# Patient Record
Sex: FEMALE | Race: AMERICAN INDIAN OR ALASKA NATIVE
[De-identification: names, ages, dates, MRNs, and addresses within clinical notes are randomized per-mention and may not be internally consistent; named-entity substitution may affect disease eponyms.]

---

## 2019-09-05 ENCOUNTER — HOSPITAL ENCOUNTER (INPATIENT)
Dept: HOSPITAL 5 - TRG | Age: 29
LOS: 2 days | Discharge: HOME | DRG: 781 | End: 2019-09-07
Attending: OBSTETRICS & GYNECOLOGY | Admitting: OBSTETRICS & GYNECOLOGY
Payer: MEDICAID

## 2019-09-05 DIAGNOSIS — O99.333: ICD-10-CM

## 2019-09-05 DIAGNOSIS — F17.200: ICD-10-CM

## 2019-09-05 DIAGNOSIS — J45.909: ICD-10-CM

## 2019-09-05 DIAGNOSIS — O99.89: Primary | ICD-10-CM

## 2019-09-05 DIAGNOSIS — O99.513: ICD-10-CM

## 2019-09-05 DIAGNOSIS — N13.2: ICD-10-CM

## 2019-09-05 DIAGNOSIS — Z3A.36: ICD-10-CM

## 2019-09-05 LAB
ALBUMIN SERPL-MCNC: 3.1 G/DL (ref 3.9–5)
ALT SERPL-CCNC: < 5 UNITS/L (ref 7–56)
BASOPHILS # (AUTO): 0.1 K/MM3 (ref 0–0.1)
BASOPHILS NFR BLD AUTO: 0.7 % (ref 0–1.8)
BILIRUB UR QL STRIP: (no result)
BLOOD UR QL VISUAL: (no result)
BUN SERPL-MCNC: 7 MG/DL (ref 7–17)
BUN/CREAT SERPL: 14 %
CALCIUM SERPL-MCNC: 8.7 MG/DL (ref 8.4–10.2)
EOSINOPHIL # BLD AUTO: 0.2 K/MM3 (ref 0–0.4)
EOSINOPHIL NFR BLD AUTO: 2 % (ref 0–4.3)
HCT VFR BLD CALC: 28.7 % (ref 30.3–42.9)
HEMOLYSIS INDEX: 7
HGB BLD-MCNC: 9.7 GM/DL (ref 10.1–14.3)
LYMPHOCYTES # BLD AUTO: 2.1 K/MM3 (ref 1.2–5.4)
LYMPHOCYTES NFR BLD AUTO: 26.7 % (ref 13.4–35)
MCHC RBC AUTO-ENTMCNC: 34 % (ref 30–34)
MCV RBC AUTO: 99 FL (ref 79–97)
MONOCYTES # (AUTO): 0.4 K/MM3 (ref 0–0.8)
MONOCYTES % (AUTO): 5.6 % (ref 0–7.3)
MUCOUS THREADS #/AREA URNS HPF: (no result) /HPF
PH UR STRIP: 6 [PH] (ref 5–7)
PLATELET # BLD: 158 K/MM3 (ref 140–440)
RBC # BLD AUTO: 2.92 M/MM3 (ref 3.65–5.03)
RBC #/AREA URNS HPF: > 182 /HPF (ref 0–6)
UROBILINOGEN UR-MCNC: 4 MG/DL (ref ?–2)
WBC #/AREA URNS HPF: 61 /HPF (ref 0–6)

## 2019-09-05 PROCEDURE — 82962 GLUCOSE BLOOD TEST: CPT

## 2019-09-05 PROCEDURE — 36415 COLL VENOUS BLD VENIPUNCTURE: CPT

## 2019-09-05 PROCEDURE — 80053 COMPREHEN METABOLIC PANEL: CPT

## 2019-09-05 PROCEDURE — 81001 URINALYSIS AUTO W/SCOPE: CPT

## 2019-09-05 PROCEDURE — 71045 X-RAY EXAM CHEST 1 VIEW: CPT

## 2019-09-05 PROCEDURE — 80048 BASIC METABOLIC PNL TOTAL CA: CPT

## 2019-09-05 PROCEDURE — 76770 US EXAM ABDO BACK WALL COMP: CPT

## 2019-09-05 PROCEDURE — C1769 GUIDE WIRE: HCPCS

## 2019-09-05 PROCEDURE — C1729 CATH, DRAINAGE: HCPCS

## 2019-09-05 PROCEDURE — 85025 COMPLETE CBC W/AUTO DIFF WBC: CPT

## 2019-09-05 PROCEDURE — 87086 URINE CULTURE/COLONY COUNT: CPT

## 2019-09-05 PROCEDURE — 76937 US GUIDE VASCULAR ACCESS: CPT

## 2019-09-05 PROCEDURE — 76775 US EXAM ABDO BACK WALL LIM: CPT

## 2019-09-05 PROCEDURE — 50432 PLMT NEPHROSTOMY CATHETER: CPT

## 2019-09-05 RX ADMIN — OXYCODONE AND ACETAMINOPHEN PRN TAB: 5; 325 TABLET ORAL at 22:20

## 2019-09-05 NOTE — HISTORY AND PHYSICAL REPORT
History of Present Illness


Date of examination: 19


Chief complaint: 





Right flank pain


History of present illness: 





Pt is a 29yo BF  EDC 19;  EGA 36 6/7 weeks presents to [facility] complaining

of right flank pain.  She had a pelvic u/s @ [facility] showing right kidney with a

large hydronephrosis and a small stone in the pelvis.  She received prenatal 

care at [facility] and had 3 previous C Sections. Prenatal records

are not available and GBS is unknown.





Past History


Past Medical History: no pertinent history


Past Surgical History:  section


Family/Genetic History: none


Social history: no significant social history, single





- Obstetrical History


Expected Date of Delivery: 19


Actual Gestation: 37 Week(s) 0 Day(s) 


: 5





Medications and Allergies


                                    Allergies











Allergy/AdvReac Type Severity Reaction Status Date / Time


 


No Known Allergies Allergy   Verified 19 19:42











                                Home Medications











 Medication  Instructions  Recorded  Confirmed  Last Taken  Type


 


Prenatal Vit Calc,Iron,Folic 1 each PO QDAY 17 19:00 

History





[Prenatal Vitamins]    1 














Review of Systems


All systems: negative





- Vital Signs


Vital signs: 


                                   Vital Signs











Pulse BP


 


 83   112/69 


 


 19 18:24  19 18:24








                                        











Temp Pulse Resp BP Pulse Ox


 


 97.9 F   75   14   97/59   99 


 


 19 21:54  19 21:58  19 21:54  19 21:58  19 21:54














- Physical Exam


Breasts: Positive: deferred


Cardiovascular: Regular rate


Lungs: Positive: Clear to auscultation


Abdomen: Positive: tenderness (Right CVAT)


Genitourinary (Female): Positive: normal external genitalia


Uterus: Positive: enlarged


Extremities: Positive: normal





- Obstetrical


FHR: category 1


Uterine Contraction Monitor Mode: External


Uterine Contraction Pattern: Absent





Results


Result Diagrams: 


                                 19 20:25





                                 19 20:25


                              Abnormal lab results











  19 Range/Units





  20:25 20:25 Unknown 


 


RBC  2.92 L    (3.65-5.03)  M/mm3


 


Hgb  9.7 L    (10.1-14.3)  gm/dl


 


Hct  28.7 L    (30.3-42.9)  %


 


MCV  99 H    (79-97)  fl


 


MCH  33 H    (28-32)  pg


 


RDW  13.1 L    (13.2-15.2)  %


 


Sodium   135 L   (137-145)  mmol/L


 


Creatinine   0.5 L   (0.7-1.2)  mg/dL


 


ALT   < 5 L   (7-56)  units/L


 


Albumin   3.1 L   (3.9-5)  g/dL


 


Urine WBC (Auto)    61.0 H  (0.0-6.0)  /HPF


 


U Epithel Cells (Auto)    20.0 H  (0-13.0)  /HPF








All other labs normal.





Ultrasound: report reviewed





Assessment and Plan





- Patient Problems


(1) 36 weeks gestation of pregnancy


Onset Date: 19   Current Visit: Yes   Status: Acute   


Plan to address problem: 


A:  IUP @ 36 6/7 weeks


      Previous C Section x 3


      Right hydronephrosis





 P:  Admit to L&D for Observation


      Obtain Urology consultation








(2) Previous  section


Onset Date: 19   Current Visit: Yes   Status: Chronic   





(3) Hydronephrosis concurrent with and due to calculi of kidney and ureter


Onset Date: 19   Current Visit: Yes   Status: Acute

## 2019-09-05 NOTE — ULTRASOUND REPORT
US renal RT



INDICATION / CLINICAL INFORMATION:

kidney stone.



COMPARISON:

None available.



FINDINGS:

Left kidney and urinary bladder are unremarkable. Right kidney is hydronephrotic, with a large (1.6 c
m) calculus in the renal pelvis near the ureteropelvic junction. Right renal cortex appears very slig
htly thinned.



IMPRESSION:

1. 1.6 cm calculus in the right renal pelvis near the ureteropelvic junction. Marked hydronephrosis a
nd slight cortical thinning suggest long-standing obstruction. 

 



Signer Name: Man Higgins MD 

Signed: 9/5/2019 8:49 PM

 Workstation Name: [id number]

## 2019-09-06 PROCEDURE — BT111ZZ FLUOROSCOPY OF RIGHT KIDNEY USING LOW OSMOLAR CONTRAST: ICD-10-PCS | Performed by: RADIOLOGY

## 2019-09-06 PROCEDURE — 0T9330Z DRAINAGE OF RIGHT KIDNEY PELVIS WITH DRAINAGE DEVICE, PERCUTANEOUS APPROACH: ICD-10-PCS | Performed by: RADIOLOGY

## 2019-09-06 RX ADMIN — Medication SCH: at 18:00

## 2019-09-06 RX ADMIN — SODIUM CHLORIDE, SODIUM LACTATE, POTASSIUM CHLORIDE, AND CALCIUM CHLORIDE SCH MLS/HR: .6; .31; .03; .02 INJECTION, SOLUTION INTRAVENOUS at 22:47

## 2019-09-06 RX ADMIN — OXYCODONE AND ACETAMINOPHEN PRN TAB: 5; 325 TABLET ORAL at 17:59

## 2019-09-06 RX ADMIN — SODIUM CHLORIDE, SODIUM LACTATE, POTASSIUM CHLORIDE, AND CALCIUM CHLORIDE SCH MLS/HR: .6; .31; .03; .02 INJECTION, SOLUTION INTRAVENOUS at 10:46

## 2019-09-06 NOTE — EVENT NOTE
Date: 09/06/19





Following the cessation of anesthesia, the patient sat upright and complained of

shortness of breath.  Her lung sounds are clear bilaterally.  She satted between

96 and 100% on room air.  A stat postprocedure chest x-ray was ordered an 

upright position and demonstrates that the lungs are clear and well-expanded 

with no pleural fluid and no evidence of pneumothorax. The nephrostomy tube is 

well below the diaphragm. The patient has a reported history of substance abuse 

and this may simply be a reaction to anesthesia.  In PACU, the patient calmed 

down and was breathing comfortably on room air.

## 2019-09-06 NOTE — XRAY REPORT
CHEST 1 VIEW 



INDICATION:  respitory difficulty.



COMPARISON:  None



FINDINGS:

Support devices: None. 



Heart: Within normal limits. 

Lungs/Pleura: No acute air space or interstitial disease. 



Additional findings: None.



IMPRESSION:

 No acute findings.



Signer Name: Arnoldo Mccrary Jr, MD 

Signed: 9/6/2019 3:54 PM

 Workstation Name: [id number]

## 2019-09-06 NOTE — POST ANESTHESIA EVALUATION
- Post Anesthesia Evaluation


Patient Participated: Yes


Airway Patent: Yes


Stable Respiratory Function: Yes


Nausea/Vomiting: Yes (resolved with IV antiemetics)


Temp > 96.8F: Yes


Pain Manageable: Yes


Adequeate Hydration: Yes


Anesthesia Complications: No


Other Comments: Immediately post-extubation, patient complained of difficulty 

breathing. Lungs clear, SpO2 >96% on room air, no evidence of residual muscle 

relaxant. Stat CXR in PACU was normal. Patient was extremely anxious and 

intermittently combative despite reassurance. Patient was given albuterol neb, 

antiemetics, and low dose ketamine IV. VS remained stable. At time fo transport 

to L&D, patient was resting calmly, normal WOB on RA, without signs of nausea. 

Post-procedure FHTs 140s.

## 2019-09-06 NOTE — CONSULTATION
History of Present Illness





- Reason for Consult


Consult date: 19





- History of Present Illness








new to our service





Pt is a 27yo BF  EDC 19;  EGA 36 6/7 weeks presents to [facility] complaining

of right flank pain.  She had a pelvic u/s @ [facility] showing right kidney with a

large hydronephrosis and a small stone in the pelvis.  She received prenatal 

care at [facility] and had 3 previous C Sections.





BISHOP---16MM RT  KIDNEY STONE WITH HYDRONEPHROSIS





S/P---RT PERC ONLY (DR. MCCORMICK - ---TODAY)





RESTING





PERC TUBE--BLOODY URINE





A/P





STABLE


HOME WITH PERC WHEN STABLE





PT TO F/U 2-3 WKS AFTER DELIVERY FOR CTAP & STONE TREATMENT PLANNING





Past History


Social history: no significant social history, single





Medications and Allergies


                                    Allergies











Allergy/AdvReac Type Severity Reaction Status Date / Time


 


No Known Allergies Allergy   Verified 19 19:42











                                Home Medications











 Medication  Instructions  Recorded  Confirmed  Last Taken  Type


 


Prenatal Vit Calc,Iron,Folic 1 each PO QDAY 17 19:00 

History





[Prenatal Vitamins]    1 











Active Meds: 


Active Medications





Acetaminophen (Tylenol)  650 mg PO Q4H PRN


   PRN Reason: Pain MILD(1-3)/Fever >100.5/HA


Docusate Sodium (Colace)  100 mg PO Q12H PRN


   PRN Reason: Constipation


Lactated Ringer's (Lactated Ringers)  1,000 mls @ 150 mls/hr IV AS DIRECT [facility]


   Last Admin: 19 10:46 Dose:  150 mls/hr


   Documented by: 


Morphine Sulfate (Morphine)  4 mg IV Q4H PRN


   PRN Reason: Pain , Severe (7-10)


Multivitamins/Iron/Calcium (Prenatal Vitamin)  1 each PO QDAY [facility]


   Last Admin: 19 18:00 Dose:  Not Given


   Documented by: 


Ondansetron HCl (Zofran)  4 mg IV Q6H PRN


   PRN Reason: Nausea And Vomiting


Oxycodone/Acetaminophen (Percocet 5/325)  2 tab PO Q6H PRN


   PRN Reason: Pain, Moderate (4-6)


   Last Admin: 19 17:59 Dose:  2 tab


   Documented by: 


Zolpidem Tartrate (Ambien)  10 mg PO ONCE PRN


   PRN Reason: Sleep











Exam





- Constitutional


Vitals: 


                                        











Temp Pulse Resp BP Pulse Ox


 


 97.6 F   102 H  16   112/65   100 


 


 19 16:55  19 17:30  19 16:55  19 16:55  19 17:30














Results





- Labs


CBC & Chem 7: 


                                 19 20:25





                                 19 20:25


Labs: 


                              Abnormal lab results











  19 Range/Units





  20:25 20:25 Unknown 


 


RBC  2.92 L    (3.65-5.03)  M/mm3


 


Hgb  9.7 L    (10.1-14.3)  gm/dl


 


Hct  28.7 L    (30.3-42.9)  %


 


MCV  99 H    (79-97)  fl


 


MCH  33 H    (28-32)  pg


 


RDW  13.1 L    (13.2-15.2)  %


 


Sodium   135 L   (137-145)  mmol/L


 


Creatinine   0.5 L   (0.7-1.2)  mg/dL


 


ALT   < 5 L   (7-56)  units/L


 


Albumin   3.1 L   (3.9-5)  g/dL


 


Urine WBC (Auto)    61.0 H  (0.0-6.0)  /HPF


 


U Epithel Cells (Auto)    20.0 H  (0-13.0)  /HPF

## 2019-09-06 NOTE — ANESTHESIA CONSULTATION
Anesthesia Consult and Med Hx


Date of service: 09/06/19





- Airway


Anesthetic Teeth Evaluation: Poor


ROM Head & Neck: Adequate


Mental/Hyoid Distance: Adequate


Mallampati Class: Class II


Intubation Access Assessment: Possibly Difficult





- Pulmonary Exam


CTA: Yes





- Cardiac Exam


Cardiac Exam: RRR





- Pre-Operative Health Status


ASA Pre-Surgery Classification: ASA2


Proposed Anesthetic Plan: General





- Pulmonary


Hx Smoking: Yes


Hx Asthma: Yes (last inhaler use 1 month ago)


Hx Respiratory Symptoms: No





- Cardiovascular System


Hx Hypertension: No


Hx Heart Attack/AMI: No


Hx Cardia Arrhythmia: No





- Central Nervous System


Hx Seizures: No


CVA: No





- Gastrointestinal


Hx Gastroesophageal Reflux Disease: No





- Endocrine


Hx Renal Disease: Yes (obstructing renal stone)


Hx End Stage Renal Disease: No


Hx Liver Disease: No


Hx Insulin Dependent Diabetes: No


Hx Non-Insulin Dependent Diabetes: No


Hx Thyroid Disease: No





- Hematic


Hx Anemia: Yes





- Other Systems


Hx Obesity: No





- Additional Comments


Anesthesia Medical History Comments: No hx anesthetic complications. Will obtain

FHTs pre- and post-procedure. Per discussion with OB, intraop FHT monitoring not

required as length of precedure estimated to be 30mins or less.

## 2019-09-06 NOTE — CONSULTATION
History of Present Illness





- Reason for Consult


Consult date: 09/06/19


right hydronephrosis with obstructing stone





- History of Present Illness





Patient with a history of right hydronephrosis with obstructing stone who is 36 

and 6/7 weeks pregnant.  Patient does not have a history of stone.  She does 

complain of right flank pain.





Past History


Social history: no significant social history, single





Medications and Allergies


                                    Allergies











Allergy/AdvReac Type Severity Reaction Status Date / Time


 


No Known Allergies Allergy   Verified 09/05/19 19:42











                                Home Medications











 Medication  Instructions  Recorded  Confirmed  Last Taken  Type


 


Prenatal Vit Calc,Iron,Folic 1 each PO QDAY 07/27/17 09/05/19 07/26/17 19:00 

History





[Prenatal Vitamins]    1 











Active Meds: 


Active Medications





Acetaminophen (Tylenol)  650 mg PO Q4H PRN


   PRN Reason: Pain MILD(1-3)/Fever >100.5/HA


Docusate Sodium (Colace)  100 mg PO Q12H PRN


   PRN Reason: Constipation


Lactated Ringer's (Lactated Ringers)  1,000 mls @ 150 mls/hr IV AS DIRECT [facility]


Morphine Sulfate (Morphine)  4 mg IV Q4H PRN


   PRN Reason: Pain , Severe (7-10)


Multivitamins/Iron/Calcium (Prenatal Vitamin)  1 each PO QDAY [facility]


Ondansetron HCl (Zofran)  4 mg IV Q6H PRN


   PRN Reason: Nausea And Vomiting


Oxycodone/Acetaminophen (Percocet 5/325)  2 tab PO Q6H PRN


   PRN Reason: Pain, Moderate (4-6)


   Last Admin: 09/05/19 22:20 Dose:  2 tab


   Documented by: 


Zolpidem Tartrate (Ambien)  10 mg PO ONCE PRN


   PRN Reason: Sleep











Review of Systems


All systems: negative





Exam





- Constitutional


Vitals: 


                                        











Temp Pulse Resp BP Pulse Ox


 


 97.0 F L  70   17   111/76   99 


 


 09/06/19 10:36  09/06/19 10:35  09/06/19 10:36  09/06/19 10:35  09/05/19 21:54











General appearance: Present: no acute distress





- EENT


Eyes: Present: PERRL, EOM intact


ENT: hearing intact





- Neck


Neck: Present: supple, normal ROM





- Respiratory


Respiratory effort: normal





- Extremities


Extremities: Full ROM





- Abdominal


General gastrointestinal: Present: deferred


Female genitourinary: Present: deferred





- Rectal


Rectal Exam: deferred





- Psychiatric


Psychiatric: appropriate mood/affect, cooperative





- Neurologic


Neurologic: no focal deficits





Results





- Labs


CBC & Chem 7: 


                                 09/05/19 20:25





                                 09/05/19 20:25


Labs: 


                              Abnormal lab results











  09/05/19 09/05/19 09/05/19 Range/Units





  20:25 20:25 Unknown 


 


RBC  2.92 L    (3.65-5.03)  M/mm3


 


Hgb  9.7 L    (10.1-14.3)  gm/dl


 


Hct  28.7 L    (30.3-42.9)  %


 


MCV  99 H    (79-97)  fl


 


MCH  33 H    (28-32)  pg


 


RDW  13.1 L    (13.2-15.2)  %


 


Sodium   135 L   (137-145)  mmol/L


 


Creatinine   0.5 L   (0.7-1.2)  mg/dL


 


ALT   < 5 L   (7-56)  units/L


 


Albumin   3.1 L   (3.9-5)  g/dL


 


Urine WBC (Auto)    61.0 H  (0.0-6.0)  /HPF


 


U Epithel Cells (Auto)    20.0 H  (0-13.0)  /HPF














- Imaging and Cardiology


Venous US: image reviewed (renal ultrasound)





Assessment and Plan





We'll plan on placing right nephrostomy tube with anesthesia given patient's 

pregnant status.  This will both allow the kidney to decompress and provide 

access for laser lithotripsy after delivery.

## 2019-09-06 NOTE — OPERATIVE REPORT
Operative Report


Operative Report: 





Exam: Ultrasound and fluoroscopic guided placement of nephrostomy tube





Clinical indication: Patient with a history of right hydronephrosis secondary to

obstructing stone.  Patient is 36 and 6/7 weeks pregnant.





Date: 09/06/2019





Procedure: Following an excellent mention of the risks, benefits and alternative

s; written informed consent was obtained.  The patient's ultrasound was reviewed

prior to the procedure.  The patient was brought to the antibiotics suite and 

placed in oblique position.  Following the induction of anesthesia with 

anesthesia services, the patient's back and flank were prepped and draped in the

usual sterile fashion.  1% lidocaine was used for anesthesia.





Under ultrasound guidance, a posterior calyx was cannulated using a 15 cm 21-

gauge needle.  A 0.018 guidewire was advanced centrally.  The needle was removed

and the inner portion of the transition dilator placed over the guidewire.  

Contrast was injected which demonstrated opacification of the renal pelvis and 

extrarenal pelvis with transit of contrast in the proximal ureter.  A 0.014 

guidewire was then advanced through the inner portion of the transition dilator 

to the level of the bladder.  The transition dilator was removed and a vertebral

catheter advanced over the guidewire however, would not track into the kidney.  

The vertebral catheter was removed and the entire transition dilator placed over

the guidewire and advanced centrally.  The guidewire and trocar were removed.  A

0.035 guidewire was then advanced through the transition dilator into the 

tortuous proximal ureter the level of the bladder.  Transition dilator was 

removed.  The vertebral catheter was then advanced into the bladder and contrast

injected.  There is poor visualization secondary to let shielding.  The 

vertebral catheter was withdrawn proximally and imaging obtained of the distal 

mid and proximal ureters which demonstrated appropriate positioning within the 

urinary collecting system.  The 0.035 guidewire was then advanced again toward 

the vertebral catheter into the proximal ureter.  The vertebral catheter 

removed.  Following serial dilation over the guidewire under fluoroscopy, an 8 

Guinean pigtail catheter was advanced over the guidewire under fluoroscopy 

centrally.  The catheter was positioned to position the pigtail within the 

central aspect of the renal pelvis.  There is prompt return of serosanguineous 

fluid.  Contrast was injected to document appropriate positioning.  The catheter

was securely fastened at the skin surface using 2-0 Ethilon suture in a StayFix 

device placed. The catheter was then placed to dependent drainage.  Sterile 

dressings were then applied.





Patient tolerated the procedure well.  There were no immediate post procedure 

competitions.





Sedation was provided by anesthesia services.  Continuous cardiopulmonary 

monitoring was utilized.





Impression: Ultrasound and fluoroscopic guided placement of right 8 Guinean 

Nephrostomy tube.

## 2019-09-06 NOTE — ANESTHESIA DAY OF SURGERY
Anesthesia Day of Surgery





- Day of Surgery


Patient Examined: Yes


Patient H&P Reviewed: Yes


Patient is NPO: Yes (last meal (eggs only) 0730.)

## 2019-09-07 VITALS — DIASTOLIC BLOOD PRESSURE: 69 MMHG | SYSTOLIC BLOOD PRESSURE: 117 MMHG

## 2019-09-07 LAB
BASOPHILS # (AUTO): 0 K/MM3 (ref 0–0.1)
BASOPHILS NFR BLD AUTO: 0.2 % (ref 0–1.8)
BUN SERPL-MCNC: 6 MG/DL (ref 7–17)
BUN/CREAT SERPL: 12 %
CALCIUM SERPL-MCNC: 8.5 MG/DL (ref 8.4–10.2)
EOSINOPHIL # BLD AUTO: 0 K/MM3 (ref 0–0.4)
EOSINOPHIL NFR BLD AUTO: 0.3 % (ref 0–4.3)
HCT VFR BLD CALC: 27.2 % (ref 30.3–42.9)
HEMOLYSIS INDEX: 0
HGB BLD-MCNC: 9.4 GM/DL (ref 10.1–14.3)
LYMPHOCYTES # BLD AUTO: 1.6 K/MM3 (ref 1.2–5.4)
LYMPHOCYTES NFR BLD AUTO: 21.3 % (ref 13.4–35)
MCHC RBC AUTO-ENTMCNC: 34 % (ref 30–34)
MCV RBC AUTO: 98 FL (ref 79–97)
MONOCYTES # (AUTO): 0.6 K/MM3 (ref 0–0.8)
MONOCYTES % (AUTO): 7.3 % (ref 0–7.3)
PLATELET # BLD: 140 K/MM3 (ref 140–440)
RBC # BLD AUTO: 2.78 M/MM3 (ref 3.65–5.03)

## 2019-09-07 RX ADMIN — Medication SCH EACH: at 10:45

## 2019-09-07 RX ADMIN — SODIUM CHLORIDE, SODIUM LACTATE, POTASSIUM CHLORIDE, AND CALCIUM CHLORIDE SCH MLS/HR: .6; .31; .03; .02 INJECTION, SOLUTION INTRAVENOUS at 07:58

## 2019-09-07 RX ADMIN — OXYCODONE AND ACETAMINOPHEN PRN TAB: 5; 325 TABLET ORAL at 02:36

## 2019-09-07 RX ADMIN — OXYCODONE AND ACETAMINOPHEN PRN TAB: 5; 325 TABLET ORAL at 07:57

## 2019-09-07 NOTE — DISCHARGE SUMMARY
Providers





- Providers


Date of Admission: 


19 00:31





Date of discharge: 19


Attending physician: 


ZIA BETH





                                        





19 22:21


Consult to Physician [CONS] Routine 


   Comment: 


   Consulting Provider: DREAD VU


   Physician Instructions: 


   Reason For Exam: Right hydronephrosis











Primary care physician: 


ZIA BETH








Hospitalization


Reason for admission: IUP -  (IUP @ 36 6/7 weeks; Previous C Section x 3;

 Right hydronephrosis with kidney stone)


Laceration: none


Incision: normal


Other postpartum procedures: none


Postpartum complications: none


Discharge diagnosis: other (IUP @ 37 0/7 weeks; Previous C Section x 3; Right hy

dronephrosis with kidney stone)


Pertinent studies: 





Ultrasound and fluoroscopic guided placement of nephrostomy tube





Hospital course: 





Pt is a 27yo BF  EDC 19;  EGA 37 0/7 weeks presented to [facility] 

complaining of right flank pain.  She had a pelvic u/s @ [facility] showing right 

kidney with a large hydronephrosis and a small stone in the pelvis.  She 

received prenatal care at [facility] and had 3 previous C 

Sections. She underwent a Right nephrostomy and postoperative course was 

unremarkable.  By POD #1 she was doing well without complaints, and therefore 

was discharged to home in stable condition.


Condition at discharge: Good


Disposition: DC-01 TO HOME OR SELFCARE





- Discharge Diagnoses


(1) 36 weeks gestation of pregnancy


Status: Acute   





(2) Previous  section


Status: Chronic   





(3) Hydronephrosis concurrent with and due to calculi of kidney and ureter


Status: Acute   





Plan





- Discharge Medications


Prescriptions: 


oxyCODONE /ACETAMINOPHEN [Percocet 5/325 mg] 1 tab PO Q6H PRN #30 tablet


 PRN Reason: Pain, Moderate (4-6)





- Provider Discharge Summary


Activity: routine, no sex for 6 weeks, no heavy lifting 4 weeks, no strenuous 

exercise


Diet: routine


Instructions: routine


Additional instructions: 


[]  Smoking cessation referral if applicable(refer to patient education folder 

for contact #)


[]  Refer to [facility] Booklet








Call your doctor immediately for:


* Fever > 100.5


* Heavy vaginal bleeding ( >1 pad per hour)


* Severe persistent headache


* Shortness of breath


* Reddened, hot, painful area to leg or breast


* Drainage or odor from incision.





* Keep incision clean and dry at all times and follow doctor's instructions 

regarding bathing/showering











- Follow up plan


Follow up: 


ZIA BETH MD [Primary Care Provider] - 3 Days

## 2019-09-07 NOTE — PROGRESS NOTE
Assessment and Plan





- Patient Problems


(1) 36 weeks gestation of pregnancy


Onset Date: 19   Current Visit: Yes   Status: Acute   


Plan to address problem: 


A:  IUP @ 37 0/7 weeks


      Previous C Section x 3


      Right hydronephrosis - resolved with nephrostomy tube





 P:  May go home today.








(2) Previous  section


Onset Date: 19   Current Visit: Yes   Status: Chronic   





(3) Hydronephrosis concurrent with and due to calculi of kidney and ureter


Onset Date: 19   Current Visit: Yes   Status: Acute   





Subjective





- Subjective


Date of service: 19


Principal diagnosis: IUP @ 37 0/7 weeks; Right Hydro with stone; Previous C 

Section x 3


Interval history: 





Pt is a 29yo BF  EDC 19;  EGA 37 0/7 weeks presented to [facility] 

complaining of right flank pain.  She had a pelvic u/s @ [facility] showing right 

kidney with a large hydronephrosis and a small stone in the pelvis.  She 

received prenatal care at [facility] and had 3 previous C 

Sections. She underwent a Right nephrostomy and is doing well.  She denies 

contractions, ROM or bleeding, and wants to go home.


Patient reports: fetal movement normal, no new complaints, no loss of fluid, no 

vaginal bleeding, no contractions





Objective





- Vital Signs


Vital Signs: 


                               Vital Signs - 12hr











  19





  22:56 02:36 02:46


 


Temperature   98.0 F


 


Pulse Rate 72  70


 


Respiratory  16 16





Rate   


 


Blood Pressure 110/70  113/74


 


Blood Pressure   113/74





[Left]   


 


O2 Sat by Pulse   





Oximetry   














  19





  07:34 07:37 07:42


 


Temperature 97.8 F  


 


Pulse Rate  71 59 L


 


Respiratory 20  





Rate   


 


Blood Pressure  119/71 


 


Blood Pressure   





[Left]   


 


O2 Sat by Pulse  100 100





Oximetry   














  19





  07:47 07:52 07:54


 


Temperature   


 


Pulse Rate 62 65 63


 


Respiratory   





Rate   


 


Blood Pressure   


 


Blood Pressure   





[Left]   


 


O2 Sat by Pulse 100 100 90





Oximetry   














  19





  07:57 08:02 08:07


 


Temperature   


 


Pulse Rate 63 65 68


 


Respiratory   





Rate   


 


Blood Pressure   


 


Blood Pressure   





[Left]   


 


O2 Sat by Pulse 98 100 100





Oximetry   














  19





  08:12 08:17


 


Temperature  


 


Pulse Rate 77 94 H


 


Respiratory  





Rate  


 


Blood Pressure  


 


Blood Pressure  





[Left]  


 


O2 Sat by Pulse 99 98





Oximetry  














- Exam


Abdomen: Present: normal appearance, soft


Uterus: Present: normal


FHR: category 1


Uterine Contraction Monitor Mode: External


Uterine Contraction Pattern: Absent





- Labs


Labs: 


                                  Abnormal Labs











  19





  20:25 20:25 Unknown


 


RBC  2.92 L  


 


Hgb  9.7 L  


 


Hct  28.7 L  


 


MCV  99 H  


 


MCH  33 H  


 


RDW  13.1 L  


 


Sodium   135 L 


 


Creatinine   0.5 L 


 


ALT   < 5 L 


 


Albumin   3.1 L 


 


Urine WBC (Auto)    61.0 H


 


U Epithel Cells (Auto)    20.0 H

## 2019-09-07 NOTE — PROGRESS NOTE
Assessment and Plan





28-year-old female with left-sided hydronephrosis and renal calculi status post 

nephrostomy tube placement.





Urine clearing.





Follow-up with urology.





Patient was told to contact us if she needs to for further assistance.  She was 

told she should have the tube changed every 10 weeks or she will at risk for 

future calculi.





Subjective


Date of service: 09/07/19


Principal diagnosis: IUP @ 37 0/7 weeks; Right Hydro with stone; Previous C 

Section x 3


Interval history: 





Right flank pain improved.  Pink urine.  Clearing.  No significant discomfort.





Objective





- Constitutional


Vitals: 


                               Vital Signs - 12hr











  09/07/19 09/07/19 09/07/19





  02:36 02:46 07:34


 


Temperature  98.0 F 97.8 F


 


Pulse Rate  70 


 


Respiratory 16 16 20





Rate   


 


Blood Pressure  113/74 


 


Blood Pressure  113/74 





[Left]   


 


O2 Sat by Pulse   





Oximetry   














  09/07/19 09/07/19 09/07/19





  07:37 07:42 07:47


 


Temperature   


 


Pulse Rate 71 59 L 62


 


Respiratory   





Rate   


 


Blood Pressure 119/71  


 


Blood Pressure   





[Left]   


 


O2 Sat by Pulse 100 100 100





Oximetry   














  09/07/19 09/07/19 09/07/19





  07:52 07:54 07:57


 


Temperature   


 


Pulse Rate 65 63 63


 


Respiratory   





Rate   


 


Blood Pressure   


 


Blood Pressure   





[Left]   


 


O2 Sat by Pulse 100 90 98





Oximetry   














  09/07/19 09/07/19 09/07/19





  08:02 08:07 08:12


 


Temperature   


 


Pulse Rate 65 68 77


 


Respiratory   





Rate   


 


Blood Pressure   


 


Blood Pressure   





[Left]   


 


O2 Sat by Pulse 100 100 99





Oximetry   














  09/07/19 09/07/19 09/07/19





  08:17 10:47 10:49


 


Temperature  98.0 F 


 


Pulse Rate 94 H  79


 


Respiratory  16 





Rate   


 


Blood Pressure   117/69


 


Blood Pressure   





[Left]   


 


O2 Sat by Pulse 98  





Oximetry   











General appearance: Present: no acute distress





- EENT


Eyes: EOM intact


ENT: hearing intact





- Respiratory


Respiratory effort: normal





- Gastrointestinal


General gastrointestinal: Present: soft, non-tender, other (no costovertebral 

tenderness)





- Psychiatric


Psychiatric: appropriate mood/affect, cooperative





- Labs


CBC & Chem 7: 


                                 09/07/19 09:41





                                 09/07/19 09:41


Labs: 


                              Abnormal lab results











  09/07/19 09/07/19 Range/Units





  09:41 09:41 


 


RBC  2.78 L   (3.65-5.03)  M/mm3


 


Hgb  9.4 L   (10.1-14.3)  gm/dl


 


Hct  27.2 L   (30.3-42.9)  %


 


MCV  98 H   (79-97)  fl


 


MCH  34 H   (28-32)  pg


 


Seg Neutrophils %  70.9 H   (40.0-70.0)  %


 


Sodium   135 L  (137-145)  mmol/L


 


BUN   6 L  (7-17)  mg/dL


 


Creatinine   0.5 L  (0.7-1.2)  mg/dL


 


Glucose   104 H  ()  mg/dL














Medications & Allergies





- Medications


Allergies/Adverse Reactions: 


                                    Allergies





No Known Allergies Allergy (Verified 09/05/19 19:42)


   








Home Medications: 


                                Home Medications











 Medication  Instructions  Recorded  Confirmed  Last Taken  Type


 


Prenatal Vit Calc,Iron,Folic 1 each PO QDAY 07/27/17 09/05/19 07/26/17 19:00 

History





[Prenatal Vitamins]    1 


 


oxyCODONE /ACETAMINOPHEN [Percocet 1 tab PO Q6H PRN #30 tablet 09/07/19  Unknown

 Rx





5/325 mg]

## 2020-01-09 ENCOUNTER — HOSPITAL ENCOUNTER (OUTPATIENT)
Dept: HOSPITAL 5 - CATHLABREC | Age: 30
Discharge: HOME | End: 2020-01-09
Attending: RADIOLOGY
Payer: MEDICARE

## 2020-01-09 VITALS — DIASTOLIC BLOOD PRESSURE: 72 MMHG | SYSTOLIC BLOOD PRESSURE: 116 MMHG

## 2020-01-09 DIAGNOSIS — F17.210: ICD-10-CM

## 2020-01-09 DIAGNOSIS — Z79.899: ICD-10-CM

## 2020-01-09 DIAGNOSIS — Z98.890: ICD-10-CM

## 2020-01-09 DIAGNOSIS — Z83.3: ICD-10-CM

## 2020-01-09 DIAGNOSIS — J45.909: ICD-10-CM

## 2020-01-09 DIAGNOSIS — N13.2: Primary | ICD-10-CM

## 2020-01-09 DIAGNOSIS — Z86.2: ICD-10-CM

## 2020-01-09 DIAGNOSIS — Z80.1: ICD-10-CM

## 2020-01-09 DIAGNOSIS — Z72.89: ICD-10-CM

## 2020-01-09 DIAGNOSIS — Z82.49: ICD-10-CM

## 2020-01-09 DIAGNOSIS — Z82.5: ICD-10-CM

## 2020-01-09 DIAGNOSIS — Z98.891: ICD-10-CM

## 2020-01-09 DIAGNOSIS — K21.9: ICD-10-CM

## 2020-01-09 LAB
BUN SERPL-MCNC: 9 MG/DL (ref 7–17)
BUN/CREAT SERPL: 13 %
CALCIUM SERPL-MCNC: 9.1 MG/DL (ref 8.4–10.2)
HCT VFR BLD CALC: 31.3 % (ref 30.3–42.9)
HEMOLYSIS INDEX: 9
HGB BLD-MCNC: 10.7 GM/DL (ref 10.1–14.3)
INR PPP: 0.96 (ref 0.87–1.13)
MCHC RBC AUTO-ENTMCNC: 34 % (ref 30–34)
MCV RBC AUTO: 93 FL (ref 79–97)
PLATELET # BLD: 271 K/MM3 (ref 140–440)
RBC # BLD AUTO: 3.35 M/MM3 (ref 3.65–5.03)

## 2020-01-09 PROCEDURE — 36415 COLL VENOUS BLD VENIPUNCTURE: CPT

## 2020-01-09 PROCEDURE — 85730 THROMBOPLASTIN TIME PARTIAL: CPT

## 2020-01-09 PROCEDURE — 85610 PROTHROMBIN TIME: CPT

## 2020-01-09 PROCEDURE — C1769 GUIDE WIRE: HCPCS

## 2020-01-09 PROCEDURE — 80048 BASIC METABOLIC PNL TOTAL CA: CPT

## 2020-01-09 PROCEDURE — 85027 COMPLETE CBC AUTOMATED: CPT

## 2020-01-09 PROCEDURE — 50389 REMOVE RENAL TUBE W/FLUORO: CPT

## 2020-01-09 RX ADMIN — LIDOCAINE HYDROCHLORIDE,EPINEPHRINE BITARTRATE ONE ML: 10; .01 INJECTION, SOLUTION INFILTRATION; PERINEURAL at 12:03

## 2020-01-09 RX ADMIN — FENTANYL CITRATE ONE MCG: 50 INJECTION, SOLUTION INTRAMUSCULAR; INTRAVENOUS at 11:59

## 2020-01-09 RX ADMIN — MIDAZOLAM ONE MG: 1 INJECTION INTRAMUSCULAR; INTRAVENOUS at 11:59

## 2020-01-09 RX ADMIN — SODIUM CHLORIDE SCH MLS: 0.9 INJECTION, SOLUTION INTRAVENOUS at 11:58

## 2020-01-09 RX ADMIN — SODIUM CHLORIDE SCH MLS/HR: 0.9 INJECTION, SOLUTION INTRAVENOUS at 10:51

## 2020-01-09 RX ADMIN — LIDOCAINE HYDROCHLORIDE,EPINEPHRINE BITARTRATE ONE ML: 10; .01 INJECTION, SOLUTION INFILTRATION; PERINEURAL at 11:58

## 2020-01-09 RX ADMIN — FENTANYL CITRATE ONE MCG: 50 INJECTION, SOLUTION INTRAMUSCULAR; INTRAVENOUS at 11:57

## 2020-01-09 RX ADMIN — MIDAZOLAM ONE MG: 1 INJECTION INTRAMUSCULAR; INTRAVENOUS at 11:58

## 2020-01-09 NOTE — SHORT STAY SUMMARY
Short Stay Documentation


Date of service: 01/09/20





- History


Principal diagnosis: right hydronephrosis resolved, nephrostomy tube no longer 

needed


Past Medical History: other (right hydronephrosis secondary to pregnancy and 

right nephrolithiasis)


Past Surgical History: Other (right nephrostomy tube placement)


Social history: no significant social history





- Allergies and Medications


Current Medications: 


                                    Allergies





No Known Allergies Allergy (Verified 09/05/19 19:42)


   





                                Home Medications











 Medication  Instructions  Recorded  Confirmed  Last Taken  Type


 


No Known Home Medications [No  01/09/20 01/09/20 Unknown History





Reported Home Medications]     








Active Medications





Sodium Chloride (Nacl 0.9% 500 Ml)  500 mls @ 50 mls/hr IV AS DIRECT SINGH


   Last Admin: 01/09/20 10:51 Dose:  50 mls/hr


   Documented by: 











- Physical exam


General appearance: no acute distress


Integumentary: no rash, no growths


HEENT: Atraumatic


Lungs: Normal air movement


Breasts: deferred


Heart: Regular rate


Gastrointestinal: normal


Female Genitourinary: deferred


Rectal Exam: deferred


Extremities: no ischemia


Neurological: Normal gait, Normal speech





- Brief post op/procedure progress note


Date of procedure: 01/09/20


Pre-op diagnosis: right nephrolithiasis


Post-op diagnosis: same


Procedure: 





right nephrostogram, neph tube removal


Anesthesia: local


Surgeon: RASHID MCCORMICK


Estimated blood loss: minimal


Pathology: none


Condition: stable





- Disposition


Condition at discharge: Good


Disposition: DC-01 TO HOME OR SELFCARE





Short Stay Discharge Plan


Activity: advance as tolerated


Weight Bearing Status: Weight Bear as Tolerated


Diet: regular


Wound: keep clean and dry, per your surgeon's advice


Follow up with: 


PRIMARY CARE,MD [Primary Care Provider] - 7 Days

## 2020-01-09 NOTE — OPERATIVE REPORT
Operative Report


Operative Report: 





Exam: Nephrostogram through indwelling nephrostomy tube, fluoroscopic guided 

removal of nephrostomy tube





Clinical indication: Patient with a history of right nephrolithiasis and 

hydronephrosis now resolved, nephrostomy tube no longer needed





Date: 1/9/2020





Procedure: Following an explanation of the risks, benefits and alternatives; 

written informed consent was obtained.  The patient was brought into Suite and 

placed in prone position on the examination table.  Initial fluoroscopic 

evaluation of the patient's right back demonstrated proper positioning of the 

patient's previously placed nephrostomy tube.  Patient was prepped and draped in

the usual sterile fashion.  1% lidocaine was used for anesthesia at the catheter

exit site.





Contrast was injected through the indwelling catheter which demonstrates prompt 

mastication of the renal pelvis.  There is decompression of the renal pelvis and

renal calyces.  The catheter was cut to release the pigtail and under 

fluoroscopy, a 0.035 guidewire advanced through the catheter to straighten it 

out.  The catheter was then removed intact.  A sterile dressing was applied.





The patient tolerated the procedure well.  There were no immediate post 

procedure complications.





A minimal amount of fentanyl was utilized for pain control.  Continuous 

cardiopulmonary monitoring was utilized.





Impression: 1) Nephrostogram through indwelling nephrostomy tube demonstrating 

decompression of the kidney with nonvisualization of previously identified 

kidney stone.  2) Fluoroscopic guided removal of nephrostomy tube

## 2021-03-01 ENCOUNTER — HOSPITAL ENCOUNTER (EMERGENCY)
Dept: HOSPITAL 5 - ED | Age: 31
LOS: 1 days | Discharge: HOME | End: 2021-03-02
Payer: MEDICAID

## 2021-03-01 DIAGNOSIS — Z79.899: ICD-10-CM

## 2021-03-01 DIAGNOSIS — Z98.890: ICD-10-CM

## 2021-03-01 DIAGNOSIS — Z79.1: ICD-10-CM

## 2021-03-01 DIAGNOSIS — G89.29: ICD-10-CM

## 2021-03-01 DIAGNOSIS — M25.511: ICD-10-CM
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
## 2021-03-01 DIAGNOSIS — F17.200: ICD-10-CM

## 2021-03-01 DIAGNOSIS — K21.9: ICD-10-CM

## 2021-03-01 DIAGNOSIS — S39.93XA: Primary | ICD-10-CM

## 2021-03-01 DIAGNOSIS — J45.909: ICD-10-CM

## 2021-03-01 PROCEDURE — 72170 X-RAY EXAM OF PELVIS: CPT

## 2021-03-02 VITALS — DIASTOLIC BLOOD PRESSURE: 86 MMHG | SYSTOLIC BLOOD PRESSURE: 120 MMHG

## 2021-03-02 NOTE — XRAY REPORT
XR pelvis 1-2V



INDICATION:

pelvic pain s/p fracture in 01/6/2021.



COMPARISON:

CT abdomen and pelvis on 9/26/2019.



FINDINGS:

There is no appreciable acute fracture or subluxation in the pelvis. Overall alignment is normal. Bon
e mineralization is normal.



Signer Name: Emile Loo MD 

Signed: 3/2/2021 12:54 AM

Workstation Name: [email or web address]

## 2021-03-02 NOTE — XRAY REPORT
RIGHT SHOULDER 4 VIEWS



INDICATION / CLINICAL INFORMATION:

shoulder pain/"poppingout of place".



COMPARISON:

None available.

 

FINDINGS:

BONES/JOINT(S): No acute fracture or subluxation. Overall alignment is normal. There are no focal bon
e lesions.



SOFT TISSUES: No significant abnormality.



ADDITIONAL FINDINGS: None.







Signer Name: Emile Loo MD 

Signed: 3/2/2021 12:55 AM

Workstation Name: [id number]

## 2021-03-02 NOTE — EMERGENCY DEPARTMENT REPORT
ED General Adult HPI





- General


Chief complaint: Extremity Injury, Lower


Stated complaint: LEFT SHOULDER/ARM/PELVIC PAIN


Time Seen by Provider: 03/02/21 00:06


Source: patient


Mode of arrival: Ambulatory


Limitations: No Limitations





- History of Present Illness


Initial comments: 


30 yr old female with pmhx asthma, GERD, and Kidney stone presents to ED with c

/o pelvic pain and right shoulder pain. Patient states she accidentally fell 

01/06/2021. She states she was walking when she stepped in a hole and fell on 

her buttocks. She states she was evaluated at the time of the fall at [facility] and was dx with pelvic fracture. She states she was given medication 

for pain in ER but states she was not given any prescriptions. She states she 

has an appointment with Dr JUAN Brownlee at [facility] for next week but she states she

is in a lot of pain. She states pain is worse when she walks, bends and moves 

her legs in certain positions. She states she has not been taking any otc meds 

for pain. She denies any new injuries since her initial accident. 





Patient also c/o right shoulder pain. She states she has had pain in right 

shoulder for a "long time" and she states every morning she feels like her 

shoulder is dislocated. She states in past month she feels like the dislocation 

is more frequent. She states she has never seen orthopedic Specialist for her 

shoulder. She states it typically "relocates on it own. She has never had to 

come to ER to have it relocated. 





MD Complaint: Right Shoulder pain; Pelvic pain 


-: week(s)





- Related Data


                                  Previous Rx's











 Medication  Instructions  Recorded  Last Taken  Type


 


oxyCODONE /ACETAMINOPHEN [Percocet 1 tab PO Q6HR PRN #25 tablet 01/09/20 Unknown

 Rx





5/325]    


 


Ibuprofen [Motrin] 800 mg PO Q8HR PRN #30 tablet 03/02/21 Unknown Rx











                                    Allergies











Allergy/AdvReac Type Severity Reaction Status Date / Time


 


No Known Allergies Allergy   Verified 09/05/19 19:42














ED Review of Systems


ROS: 


Stated complaint: LEFT SHOULDER/ARM/PELVIC PAIN


Other details as noted in HPI





Comment: All other systems reviewed and negative


Constitutional: no symptoms reported


Eyes: denies: eye pain, eye discharge, vision change


ENT: denies: ear pain, throat pain


Respiratory: denies: cough, shortness of breath, wheezing


Cardiovascular: denies: chest pain, palpitations


Gastrointestinal: denies: abdominal pain, nausea, diarrhea


Genitourinary: as per HPI


Musculoskeletal: arthralgia, other (right shoulder pain; Pelvic pain ).  denies:

 back pain, joint swelling


Skin: denies: rash, lesions


Neurological: denies: headache, weakness, paresthesias


Psychiatric: denies: anxiety, depression


Hematological/Lymphatic: denies: easy bleeding, easy bruising





ED Past Medical Hx





- Past Medical History


Previous Medical History?: Yes


Hx Hypertension: No


Hx Heart Attack/AMI: No


Hx Congestive Heart Failure: No


Hx Diabetes: No


Hx Deep Vein Thrombosis: No


Hx GERD: Yes


Hx Liver Disease: No


Hx Renal Disease: Yes (obstructing renal stone)


Hx Sickle Cell Disease: No


Hx Seizures: No


Hx Asthma: Yes (last inhaler use 1 month ago)


Hx COPD: No


Hx HIV: No





- Surgical History


Past Surgical History?: Yes


Additional Surgical History: kidney stones





- Social History


Smoking Status: Current Every Day Smoker





- Medications


Home Medications: 


                                Home Medications











 Medication  Instructions  Recorded  Confirmed  Last Taken  Type


 


oxyCODONE /ACETAMINOPHEN [Percocet 1 tab PO Q6HR PRN #25 tablet 01/09/20  

Unknown Rx





5/325]     


 


Ibuprofen [Motrin] 800 mg PO Q8HR PRN #30 tablet 03/02/21  Unknown Rx














ED Physical Exam





- General


Limitations: No Limitations


General appearance: alert, in no apparent distress





- Head


Head exam: Present: atraumatic, normocephalic, normal inspection





- Eye


Eye exam: Present: normal appearance, PERRL, EOMI


Pupils: Present: normal accommodation





- ENT


ENT exam: Present: normal exam, mucous membranes moist





- Respiratory


Respiratory exam: Present: normal lung sounds bilaterally.  Absent: respiratory 

distress





- Cardiovascular


Cardiovascular Exam: Present: regular rate, normal rhythm





- GI/Abdominal


GI/Abdominal exam: Present: soft.  Absent: distended, tenderness





- Extremities Exam


Extremities exam: Present: other (Mod ttp left buttocks, mild ttp right 

buttocks; TTP groin area bilaterally, more so on left. ROM of both hip is mildly

 reduced and painful. No bruising, swelling or deformity noted. No instability 

noted. )





- Expanded Upper Extremity Exam


  ** Right


Shoulder Exam: Present: normal inspection, full ROM, tenderness (proximal 

humerus, mild).  Absent: swelling, abrasion, laceration, ecchymosis, deformity, 

crepidus, dislocation, erythema, tenderness over AC joint, other





- Back Exam


Back exam: Present: normal inspection, full ROM.  Absent: paraspinal tenderness,

 vertebral tenderness





- Neurological Exam


Neurological exam: Present: alert, oriented X3, CN II-XII intact, normal gait





- Psychiatric


Psychiatric exam: Present: normal affect, normal mood





- Skin


Skin exam: Present: intact





ED Course


                                   Vital Signs











  03/02/21





  00:03


 


Temperature 98.1 F


 


Pulse Rate 80


 


Respiratory 16





Rate 


 


Blood Pressure 117/81


 


O2 Sat by Pulse 100





Oximetry 














ED Medical Decision Making





- Radiology Data


Radiology results: report reviewed





- Medical Decision Making


X-ray of the right shoulder and x-ray of the pelvis showed nothing acute.  

Discussed the results with patient.  Recommend she keeps an appointment with her

 orthopedic specialist next week.  Patient is well-appearing, not toxic and is 

not in any acute distress and neurologically intact with a normal gait in the 

ER.  Vital signs are stable.  No indication for any further work-up or imaging 

at this time.  Patient expressed understanding of instructions and agree with 

plan.  Patient was stable at time of discharge





Critical care attestation.: 


If time is entered above; I have spent that time in minutes in the direct care 

of this critically ill patient, excluding procedure time.








ED Disposition


Clinical Impression: 


 Pelvic injury, Chronic pain in right shoulder





Disposition: DC-01 TO HOME OR SELFCARE


Is pt being admited?: No


Does the pt Need Aspirin: No


Condition: Stable


Instructions:  Shoulder Pain, Easy-to-Read, Joint Pain, Easy-to-Read


Additional Instructions: 


Take the motrin as prescribed. Keep your appointment with the orthopedic 

Specialist as scheduled. Return to ED if worse. 


Prescriptions: 


Ibuprofen [Motrin] 800 mg PO Q8HR PRN #30 tablet


 PRN Reason: pain


Referrals: 


PRIMARY CARE,MD [Primary Care Provider] - 3-5 Days


Time of Disposition: 01:07